# Patient Record
Sex: MALE | Race: AMERICAN INDIAN OR ALASKA NATIVE | Employment: UNEMPLOYED | URBAN - METROPOLITAN AREA
[De-identification: names, ages, dates, MRNs, and addresses within clinical notes are randomized per-mention and may not be internally consistent; named-entity substitution may affect disease eponyms.]

---

## 2017-05-11 ENCOUNTER — PRE VISIT (OUTPATIENT)
Dept: OTOLARYNGOLOGY | Facility: CLINIC | Age: 52
End: 2017-05-11

## 2017-05-11 NOTE — TELEPHONE ENCOUNTER
1.  Date/reason for appt: 7/10/17 11AM paralyzed vocal chord  2.  Referring provider: Self   3.  Call to patient (Yes / No - short description): Yes, call was transferred from referrals team. Spoke with Anurag who will have pt sign [facility] forms  4.  Previous care at / records requested from:  [facility] - Faxed [facility]NT Specialist Dr. Mg - Faxed [facility]

## 2017-05-15 NOTE — TELEPHONE ENCOUNTER
Records received from [facility].   Included  Office notes: 2/1/12, 6/19/13, 8/22/13, 3/16/16, 5/11/16, 10/26/16, 5/3/17  Op/Procedure notes: microlaryngoscopy on 1/18/11, 7/26/13, 5/3/16- [facility]

## 2017-05-31 NOTE — TELEPHONE ENCOUNTER
Called pt to see where his outside recs are, pt gave me his verbal consent to speak with Yoana his health aide care provider. Sai only has recs at [facility] none at [facility]. - Closing encounter

## 2017-07-10 ENCOUNTER — OFFICE VISIT (OUTPATIENT)
Dept: OTOLARYNGOLOGY | Facility: CLINIC | Age: 52
End: 2017-07-10

## 2017-07-10 DIAGNOSIS — J38.02 BILATERAL VOCAL FOLD PARALYSIS: ICD-10-CM

## 2017-07-10 DIAGNOSIS — R49.0 DYSPHONIA: Primary | ICD-10-CM

## 2017-07-10 DIAGNOSIS — S06.9X9D TRAUMATIC BRAIN INJURY, WITH LOC OF UNSPECIFIED DURATION, SUBSEQUENT ENCOUNTER: ICD-10-CM

## 2017-07-10 DIAGNOSIS — J38.02 VOCAL FOLD PARESIS, BILATERAL: ICD-10-CM

## 2017-07-10 RX ORDER — IPRATROPIUM BROMIDE AND ALBUTEROL SULFATE 2.5; .5 MG/3ML; MG/3ML
3 SOLUTION RESPIRATORY (INHALATION)
COMMUNITY
Start: 2017-03-03

## 2017-07-10 RX ORDER — DIVALPROEX SODIUM 250 MG/1
750 TABLET, DELAYED RELEASE ORAL
COMMUNITY
Start: 2017-03-03

## 2017-07-10 RX ORDER — SCOLOPAMINE TRANSDERMAL SYSTEM 1 MG/1
2 PATCH, EXTENDED RELEASE TRANSDERMAL
COMMUNITY
Start: 2017-06-06 | End: 2018-07-06

## 2017-07-10 ASSESSMENT — PAIN SCALES - GENERAL: PAINLEVEL: NO PAIN (0)

## 2017-07-10 NOTE — MR AVS SNAPSHOT
After Visit Summary   7/10/2017    Sai Cuellar    MRN: [id number]           Patient Information     Date Of Birth          1965        Visit Information        Provider Department      7/10/2017 11:00 AM Joaquin Delaney SLP [facility]        Today's Diagnoses     Dysphonia    -  1    Bilateral vocal fold paralysis           Follow-ups after your visit        Who to contact     Please call your clinic at [phone] to:    Ask questions about your health    Make or cancel appointments    Discuss your medicines    Learn about your test results    Speak to your doctor   If you have compliments or concerns about an experience at your clinic, or if you wish to file a complaint, please contact [facility] Patient Relations at [phone] or email us at [email or web address]         Additional Information About Your Visit        MyChart Information     [employer] is an electronic gateway that provides easy, online access to your medical records. With [employer], you can request a clinic appointment, read your test results, renew a prescription or communicate with your care team.     To sign up for [employer] visit the website at [email or web address]   You will be asked to enter the access code listed below, as well as some personal information. Please follow the directions to create your username and password.     Your access code is: [email or web address]  Expires: 2017  6:30 AM     Your access code will  in 90 days. If you need help or a new code, please contact your [facility] or call [phone] for assistance.        Care EveryWhere ID     This is your Care EveryWhere ID. This could be used by other organizations to access your [place] medical records  [id number]         Blood Pressure from Last 3 Encounters:   No data found for BP    Weight from Last 3 Encounters:   No data found for Wt              We Performed the  Following     [facility] BEHAVIORAL & QUALITATIVE ANALYSIS VOICE AND RESONANCE        Primary Care Provider Office Phone # Fax #    Sunil Vance [phone]       [place] [address] [facility]  [place] [address]        Equal Access to Services     KIRBY HEATON : Hadii valerie long hadalbertinao Soomaali, waaxda luqadaha, qaybta kaalmada adeegyada, jose verenain hayaan linoroxanna pelayo cindy pantoja. [facility] [phone].    ATENCIÓN: Si habla español, tiene a kamara disposición servicios gratuitos de asistencia lingüística. [facility] [phone].    We comply with applicable federal civil rights laws and [place] laws. We do not discriminate on the basis of race, color, national origin, age, disability sex, sexual orientation or gender identity.            Thank you!     Thank you for choosing [facility]  for your care. Our goal is always to provide you with excellent care. Hearing back from our patients is one way we can continue to improve our services. Please take a few minutes to complete the written survey that you may receive in the mail after your visit with us. Thank you!             Your Updated Medication List - Protect others around you: Learn how to safely use, store and throw away your medicines at www.disposemymeds.org.          This list is accurate as of: 7/10/17 11:59 PM.  Always use your most recent med list.                   Brand Name Dispense Instructions for use Diagnosis    AKWA TEARS Oint      as needed        ascorbic acid 500 MG tablet    VITAMIN C     Take by mouth daily        aspirin 325 MG tablet      Take by mouth daily        baclofen 20 MG tablet    LIORESAL     Take 20 mg by mouth 3 times daily        BETAMETHASONE      as needed        divalproex 250 MG EC tablet    DEPAKOTE     Take 750 mg by mouth        docusate sodium 100 MG tablet    COLACE     Take 100 mg by mouth 2 times daily        * DUONEB 0.5-2.5 (3) MG/3ML neb solution   Generic drug:  ipratropium - albuterol 0.5 mg/2.5  mg/3 mL      Take 1 ampule by nebulization every 6 hours as needed        * ipratropium - albuterol 0.5 mg/2.5 mg/3 mL 0.5-2.5 (3) MG/3ML neb solution    DUONEB     3 mLs        fentaNYL 25 mcg/hr 72 hr patch    DURAGESIC     Place 1 patch onto the skin every 72 hours        ferrous sulfate 325 (65 FE) MG Tbec EC tablet      Take 325 mg by mouth daily        furosemide 20 MG tablet    LASIX     Take 20 mg by mouth daily        gabapentin 800 MG tablet    NEURONTIN     Take 800 mg by mouth 3 times daily        MULTI-VITAMIN DAILY PO      Take by mouth daily        nexIUM 40 MG CR capsule   Generic drug:  esomeprazole      Take by mouth every morning (before breakfast) Take 30-60 minutes before eating.        nicotine 7 MG/24HR 24 hr patch    NICODERM CQ     7 mg        omeprazole 20 MG CR capsule    priLOSEC          POLYETHYLENE GLYCOL      daily        potassium chloride 20 MEQ/15ML (10%) solution    KAYCIEL     Take by mouth daily        ranitidine 300 MG tablet    ZANTAC     Take by mouth At Bedtime        scopolamine 72 hr patch    TRANSDERM     2 mg        SELENIUM SULFIDE      as needed        sennosides 8.6 MG tablet    SENOKOT     Take 1 tablet by mouth 2 times daily        simvastatin 20 MG tablet    ZOCOR     Take by mouth At Bedtime        sucralfate 1 GM tablet    CARAFATE     Take by mouth 4 times daily        tiZANidine 4 MG tablet    ZANAFLEX     Take by mouth 3 times daily        traMADol 50 MG tablet    ULTRAM     Take by mouth every 6 hours as needed        TYLENOL PM EXTRA STRENGTH  MG tablet   Generic drug:  diphenhydrAMINE-acetaminophen      Take 1 tablet by mouth nightly as needed        vitamin D 1000 UNITS capsule      Take 1 capsule by mouth daily        * Notice:  This list has 2 medication(s) that are the same as other medications prescribed for you. Read the directions carefully, and ask your doctor or other care provider to review them with you.
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
## 2017-07-10 NOTE — NURSING NOTE
Chief Complaint   Patient presents with     Consult     New Throat paralysed vocal chord/previusly seen in 2013 with Dr. Spivey for same reason, has records with Dr. Mg at [address] and at [facility] on [facility].      Pt states no pain today.    N Dalton GRACIA
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
## 2017-07-10 NOTE — MR AVS SNAPSHOT
After Visit Summary   7/10/2017    Sai Cuellar    MRN: [id number]           Patient Information     Date Of Birth          1965        Visit Information        Provider Department      7/10/2017 11:00 AM Dominique Spivey MD  [facility]        Today's Diagnoses     Dysphonia    -  1    Vocal fold paresis, bilateral        Traumatic brain injury, with LOC of unspecified duration, subsequent encounter          Care Instructions    1.  You were seen in the ENT Clinic today by Dr. Spivey.  If you have any questions or concerns after your appointment, please call [phone].  Press option #1 for scheduling related needs.  Press option #3 for Nurse advice.  2.  Plan is to proceed with suggested thyroplasty procedure with Dr. Mg.      Haylee GARCIA, RN  [facility]   Head & Neck Surgery               Follow-ups after your visit        Additional Services     OTOLARYNGOLOGY REFERRAL       SPEECH-LANGUAGE PATHOLOGY SERVICE(S) REQUESTED:  Evaluate and treat    Jennifer Franklin, Ph.D., CCC/SLP  Speech-Language Pathologist  Director, [facility]  [phone]    Isabell Prakash M.M., M.A., CCC/SLP  Speech-Language Pathologist  [facility]  [phone]                  Who to contact     Please call your clinic at [phone] to:    Ask questions about your health    Make or cancel appointments    Discuss your medicines    Learn about your test results    Speak to your doctor   If you have compliments or concerns about an experience at your clinic, or if you wish to file a complaint, please contact [facility] Patient Relations at [phone] or email us at [email or web address]         Additional Information About Your Visit        MyChart Information     Univa is an electronic gateway that provides easy, online access to your medical records. With Univa, you can request a clinic appointment, read your test  results, renew a prescription or communicate with your care team.     To sign up for [employer] visit the website at [email or web address]   You will be asked to enter the access code listed below, as well as some personal information. Please follow the directions to create your username and password.     Your access code is: [email or web address]  Expires: 2017  6:30 AM     Your access code will  in 90 days. If you need help or a new code, please contact your [facility] or call [phone] for assistance.        Care EveryWhere ID     This is your Care EveryWhere ID. This could be used by other organizations to access your [place] medical records  [id number]         Blood Pressure from Last 3 Encounters:   No data found for BP    Weight from Last 3 Encounters:   No data found for Wt              We Performed the Following     IMAGESTREAM RECORDING ORDER     LARYNGOSCOPY FLEX FIBEROPTIC, DIAGNOSTIC     OTOLARYNGOLOGY REFERRAL        Primary Care Provider Office Phone # Fax #    Sunil Vance [phone]       [facility] [address] [address]        Equal Access to Services     [facility] : Hadii aad ku hadasho Soomaali, waaxda luqadaha, qaybta kaalmada adeegyada, waxay idiin hayaan mary gibbonsarageneva white . So [facility] [phone].    ATENCIÓN: Si habla español, tiene a kamara disposición servicios gratuitos de asistencia lingüística. Llame al [phone].    We comply with applicable federal civil rights laws and [place] laws. We do not discriminate on the basis of race, color, national origin, age, disability sex, sexual orientation or gender identity.            Thank you!     Thank you for choosing [facility] E[facility]SE [facility] TH[facility]AT  for your care. Our goal is always to provide you with excellent care. Hearing back from our patients is one way we can continue to improve our services. Please take a few minutes to complete the written survey  that you may receive in the mail after your visit with us. Thank you!             Your Updated Medication List - Protect others around you: Learn how to safely use, store and throw away your medicines at www.disposemymeds.org.          This list is accurate as of: 7/10/17 11:59 PM.  Always use your most recent med list.                   Brand Name Dispense Instructions for use Diagnosis    AKWA TEARS Oint      as needed        ascorbic acid 500 MG tablet    VITAMIN C     Take by mouth daily        aspirin 325 MG tablet      Take by mouth daily        baclofen 20 MG tablet    LIORESAL     Take 20 mg by mouth 3 times daily        BETAMETHASONE      as needed        divalproex 250 MG EC tablet    DEPAKOTE     Take 750 mg by mouth        docusate sodium 100 MG tablet    COLACE     Take 100 mg by mouth 2 times daily        * DUONEB 0.5-2.5 (3) MG/3ML neb solution   Generic drug:  ipratropium - albuterol 0.5 mg/2.5 mg/3 mL      Take 1 ampule by nebulization every 6 hours as needed        * ipratropium - albuterol 0.5 mg/2.5 mg/3 mL 0.5-2.5 (3) MG/3ML neb solution    DUONEB     3 mLs        fentaNYL 25 mcg/hr 72 hr patch    DURAGESIC     Place 1 patch onto the skin every 72 hours        ferrous sulfate 325 (65 FE) MG Tbec EC tablet      Take 325 mg by mouth daily        furosemide 20 MG tablet    LASIX     Take 20 mg by mouth daily        gabapentin 800 MG tablet    NEURONTIN     Take 800 mg by mouth 3 times daily        MULTI-VITAMIN DAILY PO      Take by mouth daily        nexIUM 40 MG CR capsule   Generic drug:  esomeprazole      Take by mouth every morning (before breakfast) Take 30-60 minutes before eating.        nicotine 7 MG/24HR 24 hr patch    NICODERM CQ     7 mg        omeprazole 20 MG CR capsule    priLOSEC          POLYETHYLENE GLYCOL      daily        potassium chloride 20 MEQ/15ML (10%) solution    KAYCIEL     Take by mouth daily        ranitidine 300 MG tablet    ZANTAC     Take by mouth At Bedtime         scopolamine 72 hr patch    TRANSDERM     2 mg        SELENIUM SULFIDE      as needed        sennosides 8.6 MG tablet    SENOKOT     Take 1 tablet by mouth 2 times daily        simvastatin 20 MG tablet    ZOCOR     Take by mouth At Bedtime        sucralfate 1 GM tablet    CARAFATE     Take by mouth 4 times daily        tiZANidine 4 MG tablet    ZANAFLEX     Take by mouth 3 times daily        traMADol 50 MG tablet    ULTRAM     Take by mouth every 6 hours as needed        TYLENOL PM EXTRA STRENGTH  MG tablet   Generic drug:  diphenhydrAMINE-acetaminophen      Take 1 tablet by mouth nightly as needed        vitamin D 1000 UNITS capsule      Take 1 capsule by mouth daily        * Notice:  This list has 2 medication(s) that are the same as other medications prescribed for you. Read the directions carefully, and ask your doctor or other care provider to review them with you.

## 2017-07-10 NOTE — NURSING NOTE
Procedure: Upper aerodigestive tract endoscopy, Flexible or rigid laryngoscopy, possible stroboscopy, possible flexible endoscopic evaluation of swallowing   Reason: symptoms requiring examination   PREPROCEDURE:   Yes Patient ID verified with 2 identifiers (name and  or MRN)   Yes: Procedure and site verified with patient/designee (when able)   Yes: Accurate consent documentation in medical record   No: Marking not required. Reason: [ Procedure does not require site marking. ][ Provider is in continuous attendance with the patient from consent through completion of procedure. ][ Marking unable or refused by patient (see scanned diagram).   TIME OUT:   Yes: Time-Out performed immediately prior to starting procedure, including verbal and active participation of all team members addressing:   * Correct patient identity   * Confirmed that the correct side and site are marked   * An accurate procedure consent form   * Agreement on the procedure to be done   * Correct patient position   * Relevant images and results are properly labeled and appropriately displayed   * The need to administer antibiotics or fluids for irrigation purposes during the procedure as applicable   * Safety precations based on patient history or medication use   DURING PROCEDURE: Verification of correct person, site, and procedure occurs any time the responsibility for care of the patient is transferred to another member of the care team.   Haylee Lazcano RN  P Otolaryngology/Head & Neck Surgery

## 2017-07-10 NOTE — PATIENT INSTRUCTIONS
1.  You were seen in the ENT Clinic today by Dr. Spivey.  If you have any questions or concerns after your appointment, please call [phone].  Press option #1 for scheduling related needs.  Press option #3 for Nurse advice.  2.  Plan is to proceed with suggested thyroplasty procedure with Dr. Mg.      Haylee PINTON, RN  [facility] ENT   Head & Neck Surgery
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
## 2018-07-05 ENCOUNTER — NURSING HOME VISIT (OUTPATIENT)
Dept: GERIATRICS | Facility: OTHER | Age: 53
End: 2018-07-05
Payer: MEDICAID

## 2018-07-05 VITALS
DIASTOLIC BLOOD PRESSURE: 99 MMHG | TEMPERATURE: 97.2 F | SYSTOLIC BLOOD PRESSURE: 140 MMHG | RESPIRATION RATE: 18 BRPM | HEART RATE: 92 BPM

## 2018-07-05 DIAGNOSIS — R65.21 SEPTIC SHOCK (H): ICD-10-CM

## 2018-07-05 DIAGNOSIS — E87.6 HYPOKALEMIA: ICD-10-CM

## 2018-07-05 DIAGNOSIS — D64.9 ANEMIA, UNSPECIFIED TYPE: ICD-10-CM

## 2018-07-05 DIAGNOSIS — J41.0 SIMPLE CHRONIC BRONCHITIS (H): ICD-10-CM

## 2018-07-05 DIAGNOSIS — G82.50 SPASTIC QUADRIPARESIS (H): ICD-10-CM

## 2018-07-05 DIAGNOSIS — N39.0 RECURRENT UTI: ICD-10-CM

## 2018-07-05 DIAGNOSIS — N17.9 ACUTE RENAL FAILURE, UNSPECIFIED ACUTE RENAL FAILURE TYPE (H): ICD-10-CM

## 2018-07-05 DIAGNOSIS — S06.9X0D TRAUMATIC BRAIN INJURY, WITHOUT LOSS OF CONSCIOUSNESS, SUBSEQUENT ENCOUNTER: ICD-10-CM

## 2018-07-05 DIAGNOSIS — A41.9 SEPTIC SHOCK (H): ICD-10-CM

## 2018-07-05 DIAGNOSIS — N31.9 NEUROGENIC BLADDER: ICD-10-CM

## 2018-07-05 PROBLEM — S06.9XAA TBI (TRAUMATIC BRAIN INJURY) (H): Status: ACTIVE | Noted: 2018-07-05

## 2018-07-05 PROCEDURE — 99310 SBSQ NF CARE HIGH MDM 45: CPT | Performed by: NURSE PRACTITIONER
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
## 2018-07-05 NOTE — MR AVS SNAPSHOT
"              After Visit Summary   7/5/2018    Sai Cuellar    MRN: [id number]           Patient Information     Date Of Birth          1965        Visit Information        Provider Department      7/5/2018 11:39 AM Alix Hawthorne NP [facility]        Today's Diagnoses     Traumatic brain injury, without loss of consciousness, subsequent encounter        Spastic quadriparesis (H)        Simple chronic bronchitis (H)        Septic shock (H)        Recurrent UTI        Neurogenic bladder        Acute renal failure, unspecified acute renal failure type (H)        Hypokalemia        Anemia, unspecified type           Follow-ups after your visit        Who to contact     If you have questions or need follow up information about today's clinic visit or your schedule please contact [facility] directly at [phone].  Normal or non-critical lab and imaging results will be communicated to you by [employer] Software[employer], letter or phone within 4 business days after the clinic has received the results. If you do not hear from us within 7 days, please contact the clinic through [employer] or phone. If you have a critical or abnormal lab result, we will notify you by phone as soon as possible.  Submit refill requests through [employer]Vision or call your pharmacy and they will forward the refill request to us. Please allow 3 business days for your refill to be completed.          Additional Information About Your Visit        MyChart Information     OzVision lets you send messages to your doctor, view your test results, renew your prescriptions, schedule appointments and more. To sign up, go to [email or web address] . Click on \"Log in\" on the left side of the screen, which will take you to the Welcome page. Then click on \"Sign up Now\" on the right side of the page.     You will be asked to enter the access code listed below, as well as some personal information. Please follow the directions to " create your username and password.     Your access code is: [email or web address]  Expires: 10/3/2018 11:53 AM     Your access code will  in 90 days. If you need help or a new code, please call your [facility] or [phone].        Care EveryWhere ID     This is your Care EveryWhere ID. This could be used by other organizations to access your [place] medical records  [id number]        Your Vitals Were     Pulse Temperature Respirations             92 97.2  F (36.2  C) 18          Blood Pressure from Last 3 Encounters:   18 (!) 140/99    Weight from Last 3 Encounters:   No data found for Wt              Today, you had the following     No orders found for display       Primary Care Provider Office Phone # Fax #    Radha Turpin [phone]       [address] [facility] [address]        Equal Access to Services     [facility]: Hadii aad ku hadasho Soomaali, waaxda luqadaha, qaybta kaalmada adeegyada, waxay verenain hayaan mary white . So [facility] [phone].    ATENCIÓN: Si habla español, tiene a kamara disposición servicios gratuitos de asistencia lingüística. Ovidio al [phone].    We comply with applicable federal civil rights laws and [place] laws. We do not discriminate on the basis of race, color, national origin, age, disability, sex, sexual orientation, or gender identity.            Thank you!     Thank you for choosing [facility] AND \[facility]\""  for your care. Our goal is always to provide you with excellent care. Hearing back from our patients is one way we can continue to improve our services. Please take a few minutes to complete the written survey that you may receive in the mail after your visit with us. Thank you!             Your Updated Medication List - Protect others around you: Learn how to safely use, store and throw away your medicines at www.disposemymeds.org.          This list is accurate as of 18 11:53 AM.  Always use your most  recent med list.                   Brand Name Dispense Instructions for use Diagnosis    AKWA TEARS Oint      as needed        ascorbic acid 500 MG tablet    VITAMIN C     Take by mouth daily        aspirin 325 MG tablet      Take by mouth daily        baclofen 20 MG tablet    LIORESAL     Take 20 mg by mouth 3 times daily        BETAMETHASONE      as needed        divalproex sodium delayed-release 250 MG DR tablet    DEPAKOTE     Take 750 mg by mouth        docusate sodium 100 MG tablet    COLACE     Take 100 mg by mouth 2 times daily        * DUONEB 0.5-2.5 (3) MG/3ML neb solution   Generic drug:  ipratropium - albuterol 0.5 mg/2.5 mg/3 mL      Take 1 ampule by nebulization every 6 hours as needed        * ipratropium - albuterol 0.5 mg/2.5 mg/3 mL 0.5-2.5 (3) MG/3ML neb solution    DUONEB     3 mLs        fentaNYL 25 mcg/hr 72 hr patch    DURAGESIC     Place 1 patch onto the skin every 72 hours        ferrous sulfate 325 (65 Fe) MG Tbec EC tablet      Take 325 mg by mouth daily        furosemide 20 MG tablet    LASIX     Take 20 mg by mouth daily        gabapentin 800 MG tablet    NEURONTIN     Take 800 mg by mouth 3 times daily        MULTI-VITAMIN DAILY PO      Take by mouth daily        nexIUM 40 MG CR capsule   Generic drug:  esomeprazole      Take by mouth every morning (before breakfast) Take 30-60 minutes before eating.        nicotine 7 MG/24HR 24 hr patch    NICODERM CQ     7 mg        omeprazole 20 MG CR capsule    priLOSEC          POLYETHYLENE GLYCOL      daily        potassium chloride 20 MEQ/15ML (10%) solution    KAYCIEL     Take by mouth daily        ranitidine 300 MG tablet    ZANTAC     Take by mouth At Bedtime        scopolamine 72 hr patch    TRANSDERM     2 mg        SELENIUM SULFIDE      as needed        sennosides 8.6 MG tablet    SENOKOT     Take 1 tablet by mouth 2 times daily        simvastatin 20 MG tablet    ZOCOR     Take by mouth At Bedtime        sucralfate 1 GM tablet    CARAFATE      Take by mouth 4 times daily        tiZANidine 4 MG tablet    ZANAFLEX     Take by mouth 3 times daily        traMADol 50 MG tablet    ULTRAM     Take by mouth every 6 hours as needed        TYLENOL PM EXTRA STRENGTH  MG tablet   Generic drug:  diphenhydrAMINE-acetaminophen      Take 1 tablet by mouth nightly as needed        vitamin D 1000 units capsule      Take 1 capsule by mouth daily        * Notice:  This list has 2 medication(s) that are the same as other medications prescribed for you. Read the directions carefully, and ask your doctor or other care provider to review them with you.

## 2018-07-06 ENCOUNTER — MEDICAL CORRESPONDENCE (OUTPATIENT)
Dept: HEALTH INFORMATION MANAGEMENT | Facility: OTHER | Age: 53
End: 2018-07-06

## 2018-07-06 ENCOUNTER — RESULTS ONLY (OUTPATIENT)
Dept: LAB | Age: 53
End: 2018-07-06

## 2018-07-06 ENCOUNTER — TELEPHONE (OUTPATIENT)
Dept: FAMILY MEDICINE | Facility: OTHER | Age: 53
End: 2018-07-06

## 2018-07-06 DIAGNOSIS — G89.4 CHRONIC PAIN SYNDROME: Primary | ICD-10-CM

## 2018-07-06 LAB
ANION GAP SERPL CALCULATED.3IONS-SCNC: 10 MMOL/L (ref 3–14)
BASOPHILS # BLD AUTO: 0 10E9/L (ref 0–0.2)
BASOPHILS NFR BLD AUTO: 0.3 %
BUN SERPL-MCNC: 23 MG/DL (ref 7–25)
CALCIUM SERPL-MCNC: 8.7 MG/DL (ref 8.6–10.3)
CHLORIDE SERPL-SCNC: 100 MMOL/L (ref 98–107)
CO2 SERPL-SCNC: 27 MMOL/L (ref 21–31)
CREAT SERPL-MCNC: 1.29 MG/DL (ref 0.7–1.3)
DIFFERENTIAL METHOD BLD: ABNORMAL
EOSINOPHIL # BLD AUTO: 0.1 10E9/L (ref 0–0.7)
EOSINOPHIL NFR BLD AUTO: 1 %
ERYTHROCYTE [DISTWIDTH] IN BLOOD BY AUTOMATED COUNT: 18.8 % (ref 10–15)
GFR SERPL CREATININE-BSD FRML MDRD: 58 ML/MIN/1.7M2
GLUCOSE SERPL-MCNC: 90 MG/DL (ref 70–105)
HCT VFR BLD AUTO: 35.4 % (ref 40–53)
HGB BLD-MCNC: 11.3 G/DL (ref 13.3–17.7)
IMM GRANULOCYTES # BLD: 0.1 10E9/L (ref 0–0.4)
IMM GRANULOCYTES NFR BLD: 0.8 %
LYMPHOCYTES # BLD AUTO: 2.8 10E9/L (ref 0.8–5.3)
LYMPHOCYTES NFR BLD AUTO: 29 %
MCH RBC QN AUTO: 30.1 PG (ref 26.5–33)
MCHC RBC AUTO-ENTMCNC: 31.9 G/DL (ref 31.5–36.5)
MCV RBC AUTO: 94 FL (ref 78–100)
MONOCYTES # BLD AUTO: 1 10E9/L (ref 0–1.3)
MONOCYTES NFR BLD AUTO: 10.7 %
NEUTROPHILS # BLD AUTO: 5.5 10E9/L (ref 1.6–8.3)
NEUTROPHILS NFR BLD AUTO: 58.2 %
PLATELET # BLD AUTO: 260 10E9/L (ref 150–450)
POTASSIUM SERPL-SCNC: 3.9 MMOL/L (ref 3.5–5.1)
RBC # BLD AUTO: 3.76 10E12/L (ref 4.4–5.9)
SODIUM SERPL-SCNC: 137 MMOL/L (ref 134–144)
WBC # BLD AUTO: 9.5 10E9/L (ref 4–11)

## 2018-07-06 RX ORDER — SCOLOPAMINE TRANSDERMAL SYSTEM 1 MG/1
2 PATCH, EXTENDED RELEASE TRANSDERMAL
Qty: 20 PATCH | Refills: 1 | Status: SHIPPED | OUTPATIENT
Start: 2018-07-06

## 2018-07-06 NOTE — TELEPHONE ENCOUNTER
I saw him yesterday but had very limited records for him.  I had nursing request hospital records.  I do believe it is an ongoing med.

## 2018-07-06 NOTE — TELEPHONE ENCOUNTER
I received an e-mail from [email or web address] on behalf of [facility] wanting me to get a PA on Transderm-Scop (1.5 MG) 1 MG/3DAYS 72 hr patches but it's listed as scopolamine (TRANSDERM) 72 hr patch 2 MG in the chart which is listed as a historical med anyway so it's missing the DX, amount to dispense, number of refills, whether it's RENATO or not, prescriber, and pharmacy.  Can you please update/fix this in this patients chart/med list or prescribe the correct one and let me know when it's done so I can proceed?  Anila Castle on 7/6/2018 at 12:08 PM

## 2018-07-12 ENCOUNTER — NURSING HOME VISIT (OUTPATIENT)
Dept: GERIATRICS | Facility: OTHER | Age: 53
End: 2018-07-12
Payer: MEDICAID

## 2018-07-12 VITALS — SYSTOLIC BLOOD PRESSURE: 165 MMHG | DIASTOLIC BLOOD PRESSURE: 95 MMHG | HEART RATE: 90 BPM | RESPIRATION RATE: 18 BRPM

## 2018-07-12 DIAGNOSIS — I10 BENIGN ESSENTIAL HYPERTENSION: Primary | ICD-10-CM

## 2018-07-12 DIAGNOSIS — D64.9 ANEMIA, UNSPECIFIED TYPE: ICD-10-CM

## 2018-07-12 DIAGNOSIS — M48.062 SPINAL STENOSIS OF LUMBAR REGION WITH NEUROGENIC CLAUDICATION: ICD-10-CM

## 2018-07-12 DIAGNOSIS — G82.50 SPASTIC QUADRIPARESIS (H): ICD-10-CM

## 2018-07-12 PROCEDURE — 99309 SBSQ NF CARE MODERATE MDM 30: CPT | Performed by: NURSE PRACTITIONER

## 2018-07-12 NOTE — MR AVS SNAPSHOT
"              After Visit Summary   7/12/2018    Sai Cuellar    MRN: [id number]           Patient Information     Date Of Birth          1965        Visit Information        Provider Department      7/12/2018 11:13 AM Alix Hawthorne NP [facility]        Today's Diagnoses     Benign essential hypertension    -  1    Anemia, unspecified type        Spastic quadriparesis (H)        Spinal stenosis of lumbar region with neurogenic claudication           Follow-ups after your visit        Your next 10 appointments already scheduled     Aug 01, 2018  4:45 AM CDT   Nursing Home with Radha Turpin MD   [facility] ([facility])    [address]  [place] [address] [phone]              Who to contact     If you have questions or need follow up information about today's clinic visit or your schedule please contact [facility] directly at [phone].  Normal or non-critical lab and imaging results will be communicated to you by [employer], letter or phone within 4 business days after the clinic has received the results. If you do not hear from us within 7 days, please contact the clinic through [employer] or phone. If you have a critical or abnormal lab result, we will notify you by phone as soon as possible.  Submit refill requests through Cmed or call your pharmacy and they will forward the refill request to us. Please allow 3 business days for your refill to be completed.          Additional Information About Your Visit        [employer] Services Information     Cmed lets you send messages to your doctor, view your test results, renew your prescriptions, schedule appointments and more. To sign up, go to [email or web address] . Click on \"Log in\" on the left side of the screen, which will take you to the Welcome page. Then click on \"Sign up Now\" on the right side of the page.     You will be asked to enter the access " code listed below, as well as some personal information. Please follow the directions to create your username and password.     Your access code is: [email or web address]  Expires: 10/3/2018 11:53 AM     Your access code will  in 90 days. If you need help or a new code, please call your [facility] or [phone].        Care EveryWhere ID     This is your Care EveryWhere ID. This could be used by other organizations to access your [place] medical records  [id number]        Your Vitals Were     Pulse Respirations                90 18           Blood Pressure from Last 3 Encounters:   18 (!) 165/95   18 (!) 140/99    Weight from Last 3 Encounters:   No data found for Wt              Today, you had the following     No orders found for display       Primary Care Provider Office Phone # Fax #    Radha Turpin [phone]       [address] [facility] [address]        Equal Access to Services     [facility]: Hadii aad ku hadasho Soomaali, waaxda luqadaha, qaybta kaalmada adeegyada, waxay verenain hayioanan mary white . So [facility] [phone].    ATENCIÓN: Si habla español, tiene a kamara disposición servicios gratuitos de asistencia lingüística. Llame al [phone].    We comply with applicable federal civil rights laws and [place] laws. We do not discriminate on the basis of race, color, national origin, age, disability, sex, sexual orientation, or gender identity.            Thank you!     Thank you for choosing [facility] AND [facility]  for your care. Our goal is always to provide you with excellent care. Hearing back from our patients is one way we can continue to improve our services. Please take a few minutes to complete the written survey that you may receive in the mail after your visit with us. Thank you!             Your Updated Medication List - Protect others around you: Learn how to safely use, store and throw away your medicines at  www.disposemymeds.org.          This list is accurate as of 7/12/18 11:20 AM.  Always use your most recent med list.                   Brand Name Dispense Instructions for use Diagnosis    AKWA TEARS Oint      as needed        ascorbic acid 500 MG tablet    VITAMIN C     Take by mouth daily        aspirin 325 MG tablet      Take by mouth daily        baclofen 20 MG tablet    LIORESAL     Take 20 mg by mouth 3 times daily        BETAMETHASONE      as needed        divalproex sodium delayed-release 250 MG DR tablet    DEPAKOTE     Take 750 mg by mouth        docusate sodium 100 MG tablet    COLACE     Take 100 mg by mouth 2 times daily        * DUONEB 0.5-2.5 (3) MG/3ML neb solution   Generic drug:  ipratropium - albuterol 0.5 mg/2.5 mg/3 mL      Take 1 ampule by nebulization every 6 hours as needed        * ipratropium - albuterol 0.5 mg/2.5 mg/3 mL 0.5-2.5 (3) MG/3ML neb solution    DUONEB     3 mLs        fentaNYL 25 mcg/hr 72 hr patch    DURAGESIC     Place 1 patch onto the skin every 72 hours        ferrous sulfate 325 (65 Fe) MG Tbec EC tablet      Take 325 mg by mouth daily        furosemide 20 MG tablet    LASIX     Take 20 mg by mouth daily        gabapentin 800 MG tablet    NEURONTIN     Take 800 mg by mouth 3 times daily        MULTI-VITAMIN DAILY PO      Take by mouth daily        nexIUM 40 MG CR capsule   Generic drug:  esomeprazole      Take by mouth every morning (before breakfast) Take 30-60 minutes before eating.        nicotine 7 MG/24HR 24 hr patch    NICODERM CQ     7 mg        omeprazole 20 MG CR capsule    priLOSEC          POLYETHYLENE GLYCOL      daily        potassium chloride 20 MEQ/15ML (10%) solution    KAYCIEL     Take by mouth daily        ranitidine 300 MG tablet    ZANTAC     Take by mouth At Bedtime        scopolamine 72 hr patch    TRANSDERM    20 patch    Place 2 patches onto the skin every 72 hours    Chronic pain syndrome       SELENIUM SULFIDE      as needed        sennosides 8.6  MG tablet    SENOKOT     Take 1 tablet by mouth 2 times daily        simvastatin 20 MG tablet    ZOCOR     Take by mouth At Bedtime        sucralfate 1 GM tablet    CARAFATE     Take by mouth 4 times daily        tiZANidine 4 MG tablet    ZANAFLEX     Take by mouth 3 times daily        traMADol 50 MG tablet    ULTRAM     Take by mouth every 6 hours as needed        TYLENOL PM EXTRA STRENGTH  MG tablet   Generic drug:  diphenhydrAMINE-acetaminophen      Take 1 tablet by mouth nightly as needed        vitamin D 1000 units capsule      Take 1 capsule by mouth daily        * Notice:  This list has 2 medication(s) that are the same as other medications prescribed for you. Read the directions carefully, and ask your doctor or other care provider to review them with you.

## 2018-07-12 NOTE — PROGRESS NOTES
[facility]    Chief Complaint   Patient presents with     Nursing Home Acute       HPI:    Sai Cuellar is a 52 year old  (1965), who is being seen today for an episodic care visit at [facility].  HPI information obtained from: facility staff and resident. Today's concern is: Follow-up hypertension, anemia and pain    Patient is seen today in follow-up.  He was seen 1 week ago.  His blood pressures have been monitored and these average from 134//100 with pulse averaging around 90.  He is not on any medications for hypertension.  He does take Lasix 20 mg daily for lower extremity edema.  He has a and had labs repeated this week.  His hemoglobin has improved from 10.1 g/dL to 11.3 g/dL.  He continues to have no symptoms of GI or  blood loss.  At evaluation 1 week ago he complained of back pain that radiates down his legs.  He has history of traumatic brain injury and spastic quadriparesis but also lumbar spinal stenosis with claudication.  He was on gabapentin 300 mg at bedtime.  This was increased to 300 mg twice daily last week.  He states he has not noticed any improvement in his pain.  Prior to hospitalization he had been on opioids however those were discontinued out a likely contributed to his respiratory failure.    Past medical history, past surgical history, social history, allergies and medication list available through [facility] records are reviewed.    REVIEW OF SYSTEMS:  Review of Systems  See HPI    Physical Exam:  BP (!) 165/95  Pulse 90  Resp 18  Physical Exam  Pleasant gentleman, no acute distress.  Affect normal.  Alert and oriented skin color pink.  Mucous members moist.  Lung fields clear to auscultation.  Cardiovascular regular rate and rhythm, no S3 auscultated.  Extremities with chronic edema.  Comfortable in his chair.    Recent Labs:   Reviewed and discussed  CBC RESULTS:   Recent Labs   Lab Test  07/06/18   0915   WBC  9.5   RBC  3.76*   HGB  11.3*    HCT  35.4*   MCV  94   MCH  30.1   MCHC  31.9   RDW  18.8*   PLT  260       Last Basic Metabolic Panel:  Recent Labs   Lab Test  07/06/18   0915   NA  137   POTASSIUM  3.9   CHLORIDE  100   MADY  8.7   CO2  27   BUN  23   CR  1.29   GLC  90       Liver Function Studies - No results for input(s): PROTTOTAL, ALBUMIN, BILITOTAL, ALKPHOS, AST, ALT, BILIDIRECT in the last 28766 hours.    No results found for: TSH]    No results found for: A1C      Assessment    ICD-10-CM    1. Benign essential hypertension I10    2. Anemia, unspecified type D64.9    3. Spastic quadriparesis (H) G82.50    4. Spinal stenosis of lumbar region with neurogenic claudication M48.062        Plan:  1.  Start Toprol-XL 25 mg daily continue check blood pressure and pulse and follow-up in 2 weeks  2.  Hemoglobin has improved.  Will repeat hemoglobin again in 2 weeks  3.  Continue with gabapentin 300 mg twice daily.  Discussed with patient that it was felt by hospital physician that opioid use contributed to his respiratory failure.  Do not feel comfortable restarting at this time however he does have an appointment with new primary physician in a couple of weeks and he can discuss this further.        Electronically signed by  BOBO Burt   7/12/2018  11:15 AM

## 2018-07-13 DIAGNOSIS — L21.9 SEBORRHEIC DERMATITIS: Primary | ICD-10-CM

## 2018-07-13 RX ORDER — BETAMETHASONE DIPROPIONATE 0.5 MG/G
CREAM TOPICAL
Qty: 50 G | Refills: 3 | Status: SHIPPED | OUTPATIENT
Start: 2018-07-13

## 2018-07-13 NOTE — TELEPHONE ENCOUNTER
Patient has facial Seborrheic and dermatitis .  He uses Betamethasone 0.05% daily.  [place] for this?  Norma J. Gosselin LPN....................  7/13/2018   9:45 AM

## 2018-07-25 ENCOUNTER — RESULTS ONLY (OUTPATIENT)
Dept: LAB | Age: 53
End: 2018-07-25

## 2018-07-25 ENCOUNTER — MEDICAL CORRESPONDENCE (OUTPATIENT)
Dept: HEALTH INFORMATION MANAGEMENT | Facility: OTHER | Age: 53
End: 2018-07-25

## 2018-07-25 LAB — HGB BLD-MCNC: 10.8 G/DL (ref 13.3–17.7)

## 2018-07-31 ENCOUNTER — NURSING HOME VISIT (OUTPATIENT)
Dept: GERIATRICS | Facility: OTHER | Age: 53
End: 2018-07-31
Attending: FAMILY MEDICINE
Payer: MEDICAID

## 2018-07-31 DIAGNOSIS — S06.9X0D TRAUMATIC BRAIN INJURY, WITHOUT LOSS OF CONSCIOUSNESS, SUBSEQUENT ENCOUNTER: ICD-10-CM

## 2018-07-31 DIAGNOSIS — M48.062 SPINAL STENOSIS OF LUMBAR REGION WITH NEUROGENIC CLAUDICATION: ICD-10-CM

## 2018-07-31 DIAGNOSIS — G82.50 SPASTIC QUADRIPARESIS (H): ICD-10-CM

## 2018-07-31 DIAGNOSIS — R13.10 DYSPHAGIA, UNSPECIFIED TYPE: Primary | ICD-10-CM

## 2018-07-31 DIAGNOSIS — I10 BENIGN ESSENTIAL HYPERTENSION: ICD-10-CM

## 2018-07-31 PROCEDURE — 99307 SBSQ NF CARE SF MDM 10: CPT | Performed by: FAMILY MEDICINE

## 2018-07-31 NOTE — MR AVS SNAPSHOT
"              After Visit Summary   7/31/2018    Sai Cuellar    MRN: [id number]           Patient Information     Date Of Birth          1965        Visit Information        Provider Department      7/31/2018 4:30 AM Sandy Ospina MD [facility]        Today's Diagnoses     Dysphagia, unspecified type    -  1    Traumatic brain injury, without loss of consciousness, subsequent encounter        Spastic quadriparesis (H)        Benign essential hypertension        Spinal stenosis of lumbar region with neurogenic claudication           Follow-ups after your visit        Who to contact     If you have questions or need follow up information about today's clinic visit or your schedule please contact [facility] directly at [phone].  Normal or non-critical lab and imaging results will be communicated to you by Motorpaneerhart, letter or phone within 4 business days after the clinic has received the results. If you do not hear from us within 7 days, please contact the clinic through Motorpaneerhart or phone. If you have a critical or abnormal lab result, we will notify you by phone as soon as possible.  Submit refill requests through [email or web address] or call your pharmacy and they will forward the refill request to us. Please allow 3 business days for your refill to be completed.          Additional Information About Your Visit        MyChart Information     [email or web address] lets you send messages to your doctor, view your test results, renew your prescriptions, schedule appointments and more. To sign up, go to [email or web address] . Click on \"Log in\" on the left side of the screen, which will take you to the Welcome page. Then click on \"Sign up Now\" on the right side of the page.     You will be asked to enter the access code listed below, as well as some personal information. Please follow the directions to create your username and password.     Your access code is: [email or web address]  Expires: " 10/3/2018 11:53 AM     Your access code will  in 90 days. If you need help or a new code, please call your [place] clinic or [phone].        Care EveryWhere ID     This is your Care EveryWhere ID. This could be used by other organizations to access your [place] medical records  [id number]         Blood Pressure from Last 3 Encounters:   18 (!) 165/95   18 (!) 140/99    Weight from Last 3 Encounters:   No data found for Wt              Today, you had the following     No orders found for display       Primary Care Provider Office Phone # Fax #    Radha Turpin [phone] [address] [facility] [address]        Equal Access to Services     GERALD HEATON : Hadii aad ku hadasho Soomaali, waaxda luqadaha, qaybta kaalmada adeegyada, jose white . So [facility] [phone].    ATENCIÓN: Si habla español, tiene a kamara disposición servicios gratuitos de asistencia lingüística. Llame al [phone].    We comply with applicable federal civil rights laws and [place] laws. We do not discriminate on the basis of race, color, national origin, age, disability, sex, sexual orientation, or gender identity.            Thank you!     Thank you for choosing [facility]  for your care. Our goal is always to provide you with excellent care. Hearing back from our patients is one way we can continue to improve our services. Please take a few minutes to complete the written survey that you may receive in the mail after your visit with us. Thank you!             Your Updated Medication List - Protect others around you: Learn how to safely use, store and throw away your medicines at www.disposemymeds.org.          This list is accurate as of 18 11:59 PM.  Always use your most recent med list.                   Brand Name Dispense Instructions for use Diagnosis    AKWA TEARS Oint      as needed        ascorbic acid 500 MG tablet     VITAMIN C     Take by mouth daily        aspirin 325 MG tablet      Take by mouth daily        augmented betamethasone dipropionate 0.05 % cream    DIPROLENE-AF    50 g    Apply sparingly to affected area twice daily as needed.  Do not apply to face.    Seborrheic dermatitis       baclofen 20 MG tablet    LIORESAL     Take 20 mg by mouth 3 times daily        divalproex sodium delayed-release 250 MG DR tablet    DEPAKOTE     Take 750 mg by mouth        docusate sodium 100 MG tablet    COLACE     Take 100 mg by mouth 2 times daily        * DUONEB 0.5-2.5 (3) MG/3ML neb solution   Generic drug:  ipratropium - albuterol 0.5 mg/2.5 mg/3 mL      Take 1 ampule by nebulization every 6 hours as needed        * ipratropium - albuterol 0.5 mg/2.5 mg/3 mL 0.5-2.5 (3) MG/3ML neb solution    DUONEB     3 mLs        fentaNYL 25 mcg/hr 72 hr patch    DURAGESIC     Place 1 patch onto the skin every 72 hours        ferrous sulfate 325 (65 Fe) MG Tbec EC tablet      Take 325 mg by mouth daily        furosemide 20 MG tablet    LASIX     Take 20 mg by mouth daily        gabapentin 800 MG tablet    NEURONTIN     Take 800 mg by mouth 3 times daily        MULTI-VITAMIN DAILY PO      Take by mouth daily        nexIUM 40 MG CR capsule   Generic drug:  esomeprazole      Take by mouth every morning (before breakfast) Take 30-60 minutes before eating.        nicotine 7 MG/24HR 24 hr patch    NICODERM CQ     7 mg        omeprazole 20 MG CR capsule    priLOSEC          POLYETHYLENE GLYCOL      daily        potassium chloride 20 MEQ/15ML (10%) solution    KAYCIEL     Take by mouth daily        ranitidine 300 MG tablet    ZANTAC     Take by mouth At Bedtime        scopolamine 72 hr patch    TRANSDERM    20 patch    Place 2 patches onto the skin every 72 hours    Chronic pain syndrome       SELENIUM SULFIDE      as needed        sennosides 8.6 MG tablet    SENOKOT     Take 1 tablet by mouth 2 times daily        simvastatin 20 MG tablet    ZOCOR      Take by mouth At Bedtime        sucralfate 1 GM tablet    CARAFATE     Take by mouth 4 times daily        tiZANidine 4 MG tablet    ZANAFLEX     Take by mouth 3 times daily        traMADol 50 MG tablet    ULTRAM     Take by mouth every 6 hours as needed        TYLENOL PM EXTRA STRENGTH  MG tablet   Generic drug:  diphenhydrAMINE-acetaminophen      Take 1 tablet by mouth nightly as needed        vitamin D 1000 units capsule      Take 1 capsule by mouth daily        * Notice:  This list has 2 medication(s) that are the same as other medications prescribed for you. Read the directions carefully, and ask your doctor or other care provider to review them with you.
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
## 2018-08-02 PROBLEM — M54.2 CHRONIC NECK AND BACK PAIN: Status: ACTIVE | Noted: 2017-02-22

## 2018-08-02 PROBLEM — J38.00 VOCAL CORD PARALYSIS: Status: ACTIVE | Noted: 2018-05-18

## 2018-08-02 PROBLEM — M54.9 CHRONIC NECK AND BACK PAIN: Status: ACTIVE | Noted: 2017-02-22

## 2018-08-02 PROBLEM — J96.01 ACUTE RESPIRATORY FAILURE WITH HYPOXIA AND HYPERCAPNIA (H): Status: ACTIVE | Noted: 2018-06-02

## 2018-08-02 PROBLEM — F34.1 DYSTHYMIC DISORDER: Status: ACTIVE | Noted: 2018-08-02

## 2018-08-02 PROBLEM — E66.01 OBESITY, MORBID, BMI 40.0-49.9 (H): Status: ACTIVE | Noted: 2017-11-14

## 2018-08-02 PROBLEM — H50.51 ESOPHORIA: Status: ACTIVE | Noted: 2018-08-02

## 2018-08-02 PROBLEM — Z71.89 ACP (ADVANCE CARE PLANNING): Status: ACTIVE | Noted: 2018-06-11

## 2018-08-02 PROBLEM — F11.20 OPIOID TYPE DEPENDENCE (H): Status: ACTIVE | Noted: 2018-08-02

## 2018-08-02 PROBLEM — E66.811 OBESITY (BMI 30.0-34.9): Status: ACTIVE | Noted: 2018-06-03

## 2018-08-02 PROBLEM — J96.02 ACUTE RESPIRATORY FAILURE WITH HYPOXIA AND HYPERCAPNIA (H): Status: ACTIVE | Noted: 2018-06-02

## 2018-08-02 PROBLEM — Z93.59 SUPRAPUBIC CATHETER (H): Status: ACTIVE | Noted: 2017-03-19

## 2018-08-02 PROBLEM — F17.200 TOBACCO USE DISORDER: Status: ACTIVE | Noted: 2018-08-02

## 2018-08-02 PROBLEM — R47.1 DYSARTHRIA: Status: ACTIVE | Noted: 2017-11-14

## 2018-08-02 PROBLEM — G89.29 CHRONIC NECK AND BACK PAIN: Status: ACTIVE | Noted: 2017-02-22

## 2018-08-02 PROBLEM — R60.0 LOWER EXTREMITY EDEMA: Status: ACTIVE | Noted: 2017-11-14

## 2018-08-02 PROBLEM — R47.82 FLUENCY DISORDER ASSOCIATED WITH UNDERLYING DISEASE: Status: ACTIVE | Noted: 2018-02-26

## 2018-08-02 RX ORDER — NYSTATIN 100000 [USP'U]/G
1 POWDER TOPICAL DAILY
COMMUNITY

## 2018-08-02 RX ORDER — CITRIC ACID, GLUCONOLACTONE AND MAGNESIUM CARBONATE 6.602; .198; 3.268 G/100ML; G/100ML; G/100ML
100 SOLUTION IRRIGATION DAILY
COMMUNITY

## 2018-08-02 RX ORDER — TRIAMCINOLONE ACETONIDE 1 MG/G
CREAM TOPICAL 2 TIMES DAILY
COMMUNITY
Start: 2018-05-30

## 2018-08-02 RX ORDER — SULFAMETHOXAZOLE/TRIMETHOPRIM 800-160 MG
1 TABLET ORAL ONCE
COMMUNITY

## 2018-08-02 RX ORDER — BISACODYL 10 MG
10 SUPPOSITORY, RECTAL RECTAL DAILY PRN
COMMUNITY

## 2018-08-02 RX ORDER — LACTULOSE 10 G/15ML
30 SOLUTION ORAL 2 TIMES DAILY PRN
COMMUNITY
Start: 2017-03-03

## 2018-08-02 RX ORDER — ACETAMINOPHEN 325 MG/1
650 TABLET ORAL EVERY 4 HOURS PRN
COMMUNITY

## 2018-08-02 RX ORDER — MUPIROCIN 20 MG/G
OINTMENT TOPICAL 3 TIMES DAILY
COMMUNITY

## 2018-08-02 RX ORDER — LIDOCAINE 4 G/G
1 PATCH TOPICAL EVERY 12 HOURS
COMMUNITY
Start: 2018-06-22

## 2018-08-09 RX ORDER — METOPROLOL SUCCINATE 25 MG/1
25 TABLET, EXTENDED RELEASE ORAL DAILY
Qty: 30 TABLET | Refills: 1 | COMMUNITY
Start: 2018-08-02

## 2021-05-31 ENCOUNTER — RECORDS - HEALTHEAST (OUTPATIENT)
Dept: ADMINISTRATIVE | Facility: CLINIC | Age: 56
End: 2021-05-31

## 2021-07-07 ENCOUNTER — TRANSFERRED RECORDS (OUTPATIENT)
Dept: HEALTH INFORMATION MANAGEMENT | Facility: CLINIC | Age: 56
End: 2021-07-07

## 2021-07-07 ENCOUNTER — MEDICAL CORRESPONDENCE (OUTPATIENT)
Dept: HEALTH INFORMATION MANAGEMENT | Facility: CLINIC | Age: 56
End: 2021-07-07

## 2021-07-20 ENCOUNTER — MEDICAL CORRESPONDENCE (OUTPATIENT)
Dept: HEALTH INFORMATION MANAGEMENT | Facility: CLINIC | Age: 56
End: 2021-07-20

## 2021-07-20 ENCOUNTER — TRANSCRIBE ORDERS (OUTPATIENT)
Dept: OTHER | Age: 56
End: 2021-07-20

## 2021-07-20 DIAGNOSIS — J38.02 VOCAL CORD PARALYSIS, BILATERAL PARTIAL: Primary | ICD-10-CM

## 2021-07-21 NOTE — TELEPHONE ENCOUNTER
FUTURE VISIT INFORMATION      FUTURE VISIT INFORMATION:    Date: 11/1/21    Time: 1 PM with SLP    Location: [facility]  REFERRAL INFORMATION:    Referring provider:  Dr. Jennifer Serrano    Referring providers clinic:  [facility]    Reason for visit/diagnosis: Vocal Cord Paralysis    RECORDS REQUESTED FROM:       Clinic name Comments Records Status Imaging Status   [facility] 7/7/21 - ENT OV with Dr. Serrano Care Everywhere    [facility] - Imaging 7/5/21 - XR Chest  4/19/20 - CT Chest  4/19/20 - XR Chest  3/4/20 - XR Video Swallow  9/18/18 - XR Video Swallow  9/27/16 - XR Video Swallow Care Everywhere 7/21 Req - In PACs   [facility] 6/1/18 - Admission with Dr. Mg Care Everywhere    [facility] - Surgery 6/1/18 - OP Note for DIRECT LARYNGOSCOPY WITH INJECTION INTO RIGHT ANTERIOR VOCAL CORDS WITH 2CC PROLARYN PLUS with Dr. Saurav Resendiz Everywhere    [facility] - Imaging 6/12/18 - XR Neck  6/7/18 - XR Video Swallow Care Everywhere 7/21 Req - In PACs   [facility] 7/10/17 - ENT OV with Dr. Spivey [facility] - Surgery 5/3/16 - OP Note for DIRECT LARYNGOSCOPY WITH INJECTION INTO RIGHT VOCAL CORD with Dr. Saurav Resendiz Everywhere            * 7/21/21 3:06 PM Faxed req to [facility] for images to be pushed to [facility]s. - Gwendolyn

## 2021-11-01 ENCOUNTER — OFFICE VISIT (OUTPATIENT)
Dept: OTOLARYNGOLOGY | Facility: CLINIC | Age: 56
End: 2021-11-01
Payer: MEDICAID

## 2021-11-01 ENCOUNTER — VIRTUAL VISIT (OUTPATIENT)
Dept: OTOLARYNGOLOGY | Facility: CLINIC | Age: 56
End: 2021-11-01
Payer: MEDICAID

## 2021-11-01 ENCOUNTER — PRE VISIT (OUTPATIENT)
Dept: OTOLARYNGOLOGY | Facility: CLINIC | Age: 56
End: 2021-11-01

## 2021-11-01 VITALS — OXYGEN SATURATION: 94 % | HEART RATE: 71 BPM

## 2021-11-01 DIAGNOSIS — J38.01 UNILATERAL VOCAL FOLD PARALYSIS: ICD-10-CM

## 2021-11-01 DIAGNOSIS — R13.10 DYSPHAGIA, UNSPECIFIED TYPE: ICD-10-CM

## 2021-11-01 DIAGNOSIS — J38.01 UNILATERAL PARTIAL VOCAL CORD PARALYSIS: ICD-10-CM

## 2021-11-01 DIAGNOSIS — R49.0 DYSPHONIA: Primary | ICD-10-CM

## 2021-11-01 DIAGNOSIS — S06.9X0D TRAUMATIC BRAIN INJURY, WITHOUT LOSS OF CONSCIOUSNESS, SUBSEQUENT ENCOUNTER: ICD-10-CM

## 2021-11-01 DIAGNOSIS — J38.01 UNILATERAL COMPLETE VOCAL FOLD PARALYSIS: ICD-10-CM

## 2021-11-01 PROCEDURE — 99205 OFFICE O/P NEW HI 60 MIN: CPT | Mod: 25 | Performed by: OTOLARYNGOLOGY

## 2021-11-01 PROCEDURE — 92524 BEHAVRAL QUALIT ANALYS VOICE: CPT | Mod: GN | Performed by: SPEECH-LANGUAGE PATHOLOGIST

## 2021-11-01 PROCEDURE — 31575 DIAGNOSTIC LARYNGOSCOPY: CPT | Performed by: OTOLARYNGOLOGY

## 2021-11-01 RX ORDER — NYSTATIN 100000 [USP'U]/G
POWDER TOPICAL
COMMUNITY
Start: 2020-11-25

## 2021-11-01 RX ORDER — SENNOSIDES A AND B 8.6 MG/1
17.2 TABLET, FILM COATED ORAL
COMMUNITY
Start: 2021-10-01

## 2021-11-01 RX ORDER — MAGNESIUM HYDROXIDE 1200 MG/15ML
LIQUID ORAL
COMMUNITY
Start: 2021-10-27
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
## 2021-11-01 NOTE — PATIENT INSTRUCTIONS
1.  You were seen in the ENT Clinic today by . If you have any questions or concerns after your appointment, please call [phone]. Press option #1 for scheduling related needs. Press option #3 for Nurse advice.    2.   has recommended the following:   - updated xray video swallow. Can be done locally    3.  Plan is to return to clinic as needed.      Yahaira Schuler LPN  [phone]  [facility] - Otolaryngology
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
## 2021-11-01 NOTE — NURSING NOTE
Chief Complaint   Patient presents with     Consult     new patient       Pulse 71, SpO2 94 %.    Scotty Walton, EMT

## 2021-11-02 ENCOUNTER — TELEPHONE (OUTPATIENT)
Dept: OTOLARYNGOLOGY | Facility: CLINIC | Age: 56
End: 2021-11-02

## 2021-11-02 NOTE — TELEPHONE ENCOUNTER
Spoke to staff at patients group home in regards to recommended Xray video swallow for patient. Pt had stated during his recent visit he would like to do this locally, writer is inquiring about where the referral should be sent. Staff member at group home took down writers name and number and will have someone follow up tomorrow regarding this information.

## 2021-11-04 ENCOUNTER — DOCUMENTATION ONLY (OUTPATIENT)
Dept: OTOLARYNGOLOGY | Facility: CLINIC | Age: 56
End: 2021-11-04

## 2021-11-04 DIAGNOSIS — R13.10 DYSPHAGIA: Primary | ICD-10-CM

## 2021-11-04 NOTE — PROGRESS NOTES
Order for xray video swallow was faxed to Suzi at [facility]. Fax number [phone]. 2 pgs faxed.

## 2021-11-05 ENCOUNTER — TELEPHONE (OUTPATIENT)
Dept: OTOLARYNGOLOGY | Facility: CLINIC | Age: 56
End: 2021-11-05

## 2023-09-12 NOTE — TELEPHONE ENCOUNTER
[facility] Call Center    Phone Message    May a detailed message be left on voicemail: yes     Reason for Call: Other: Renetta with [facility] called and stated they received an order for Pt but only received the cover page, not the actual order. Please resend order to Fax # [phone]. Please call Renetta with any questions. Thank you.     Action Taken: Message routed to:  [facility] ENT    Travel Screening: Not Applicable                                                                        
Writer faxed Video Swallow Study Order to provided fax number.    Tana Zambrano, EMT  
Croup in Children    Your child was seen in the Emergency Department for Croup.      Croup begins like a cold with cough, fever, and a runny nose.  It then progresses to upper airway swelling (on day 1 or 2) and tends to occur late at night.  As your child's airway becomes swollen, he or she may have any of the following:  -Barking cough that is worse at night  -Noisy, fast, or difficult breathing  -High pitched noise with each breath in    This condition is caused by a virus, most commonly parainfluenza.  It usually occurs during the common cold season.  You can get the virus the same way you can catch other viruses, such as contact with the virus from someone else who had the virus.   There is no laboratory test for croup.  Croup occurs most commonly in children ages 6 months to 5 years.     General tips for managing croup at home:    If the symptoms are mild:   -Cold air may help your child breathe easier and decrease his or her cough. Take your child outside if it is cold. Or, take your child into the bathroom and turn on a cold shower or you can even get cold air from an air conditioner or the freezer or refrigerator.  -Medicines:   -We do not recommend you giving any cold or cough medicines to children under 6 years of age. We don’t find them helpful and they may have side effects.  -We do recommend using medications to reduce the fever or for pain relief such as acetaminophen or ibuprofen.     If the symptoms are more severe:  -Medicines:  -You will receive a steroid in the Emergency Department and that is used to decrease some of the inflammation.    -Another medicine called racemic epinephrine may be given if there is significant swelling via a nebulizer or a pump to reduce the swelling (this can only be done in the Emergency Department, not at home).     Follow up with your pediatrician in 1-2 days to make sure that your child is doing better.    Return to the Emergency Department if your child has:  -difficulty breathing or gasping for air  -signs of dehydration such as no urine in 8-12 hours, dry or cracked lips or dry mouth, not making tears while crying, sunken eyes, or excessive sleepiness or weakness.